# Patient Record
Sex: FEMALE | Race: BLACK OR AFRICAN AMERICAN | URBAN - METROPOLITAN AREA
[De-identification: names, ages, dates, MRNs, and addresses within clinical notes are randomized per-mention and may not be internally consistent; named-entity substitution may affect disease eponyms.]

---

## 2022-08-01 ENCOUNTER — HOSPITAL ENCOUNTER (OUTPATIENT)
Dept: NUTRITION | Age: 62
Discharge: HOME | End: 2022-08-01
Payer: COMMERCIAL

## 2022-08-01 PROCEDURE — 97802 MEDICAL NUTRITION INDIV IN: CPT

## 2022-08-03 NOTE — PROGRESS NOTES
[address] [place] [address]     Nutrition Assessment - Medical Nutrition Therapy   Outpatient Initial Evaluation         Patient Name: Aniya Strickland : 1960   Treatment Diagnosis: Obesity     Referral Source: Alwin Cogan, MD [facility]): 2022     Gender: female Age: 63 y.o. Ht: 60 in Wt: 279  lb  kg   BMI: 47 BMR   Male  BMR Female [id number]     Past Medical History:  Obesity       Pertinent Medications:   Amlodipine     Biochemical Data:   No results found for: HBA1C, [id number], [id number]  No results found for: NA, K, CL, CO2, AGAP, GLU, BUN, CREA, BUCR, GFRAA, GFRNA, CA, TBIL, TBILI, AP, TP, ALB, GLOB, AGRAT, ALT, AST  No results found for: CHOL, CHOLPOCT, CHOLX, CHLST, CHOLV, TOTCHOLEXT, HDL, HDLPOC, HDLEXT, HDLP, LDL, LDLCPOC, LDLCEXT, LDLC, DLDLP, VLDLC, VLDL, TGLX, TRIGL, TRIGLYCEXT, TRIGP, TGLPOCT, CHHD, CHHDX  No results found for: ALT, AST, GGT, GGTP, AP, APIT, APX, CBIL, TBIL, TBILI  No results found for: RONNY, CREAPOC, ACREA, CREA, REFC3, REFC4  No results found for: BUN, BUNPOC, IBUN, MBUNV, BUNV  No results found for: MCACR, MCA1, MCA2, MCA3, MCAU, MCAU2, MCALPOCT     Assessment:   Patient is a 63year old female  with a BMI of 54 who visits  for insight on how to lose weight. 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 Patient's activity consists of a sedentary lifestyle. Patient's sleeping habits consists of some need of improvement. Food & Nutrition: Patient eats 3 meals a day and snacks. Breakfast may consist of scrambled eggs, piece of bread, peanut butter and jellly. A lunch choice may be fruit choices, cheese and crackers, pb&j. Dinner choices consist of a large portion of fried chicken or fish a vegetable and a starch. Snacks may be fruit, cheese and crackers.        Estimate Needs   Calories: 140  Protein: 60 Carbs: <125 Fat: 60   Kcal/day  g/day g/day  g/day                            Nutrition Diagnosis Overweight/obesity related to excessive energy intake as evidenced by a BMI of 54. Undesirable food choices as evidenced by patients food recall; patient eats many higher carb snacks especially in the evening. Disordered eating pattern; patient eats a late breakfast and eats late at night. Nutrition Intervention &  Education: Encouraged pt to drink >64 ounces of only calorie free or very low calorie/carb beverages only. Educated pt on all carbohydrates found in foods and encouraged no more than 35-40 gm/meal and 15-20 gm/snack. Patient was educated on the importance of making lifestyle changes such as:  Eating off a smaller plate and drinking from smaller glasses. Increasing activity. Menu planning and tracking. Recommend My Fitness Pal for tracking. The importance of eating breakfast.  Appropriate snacks. Portion control. The importance of good sleeping habits. How to read a label. Patient was encouraged to do this before purchasing and consuming an item. Handouts Provided: [x]  Carbohydrates  [x]  Protein  [x]  Non-starchy Vegatbles  [x]  Food Label  [x]  Meal and Snack Ideas  [x]  Food Journals []  Diabetes  []  Cholesterol  []  Sodium  [x]  Gen Nutr Guidelines  []  SBGM Guidelines  []  Others:   Information Reviewed with: Patient    Readiness to Change Stage:   []  Pre-contemplative    []  Contemplative  []  Preparation               [x]  Action                  []  Maintenance   Potential Barriers to Learning: []  Decline in memory    []  Language barrier   []  Other:  []  Emotional                  []  Limited mobility  []  Lack of motivation     [] Vision, hearing or cognitive impairment   Expected Compliance: Good.      Nutritional Goal - To promote lifestyle changes to result in:    [x]  Weight loss  []  Improved diabetic control  []  Decreased cholesterol levels  []  Decreased blood pressure  []  Weight maintenance [] Preventing any interactions associated with food allergies  []  Adequate weight gain toward goal weight  []  Other:        Patient Goals:    Patient to improve her sleeping habits. Patient will begin to read labels. Patient will begin to monitor carbohydrate intake and combine with vegetables or a protein. Dietitian Signature:  Stas Leal RD Date: 08/01/2022   Follow-up: Scheduled  09/12/2022  4:30 pm

## 2022-09-12 ENCOUNTER — HOSPITAL ENCOUNTER (OUTPATIENT)
Dept: NUTRITION | Age: 62
Discharge: HOME | End: 2022-09-12
Payer: COMMERCIAL

## 2022-09-12 PROCEDURE — 97803 MED NUTRITION INDIV SUBSEQ: CPT

## 2022-09-14 NOTE — PROGRESS NOTES
NUTRITION - FOLLOW-UP TREATMENT NOTE  Patient Name: Hugh Notice         Date: 2022  : 1960    YES/NO Patient  Verified  Diagnosis: Obesity      Total Treatment Time (min):   30     SUBJECTIVE/ASSESSMENT:  Patient is doing well. She had a rough months due to several anniversaries related to her   who she misses. She still managed to lose 2#. Changes in medication or medical history? Any new allergies, surgeries or procedures? YES/NO    If yes, update Summary List   None reported         Current Wt: 277# Previous Wt: 279# Wt Change: 2#     Achievement of Goals:  Patient has lost 2#     Patient Education:  [x]  Review current plan with patient   []  Other:    Handouts/  Information Provided: []  Carbohydrates  []  Protein  []  Fiber  []  Serving Sizes  []  Fluids  []  General guidelines []  Diabetes  []  Cholesterol  []  Sodium  []  SBGM  []  Food Journals  []  Others:      New Patient Goals: Go for walk in AM for practice for when patient gets her dog. Increase vegetables.      PLAN    [x]  Continue on current plan []  Follow-up PRN   []  Discharge due to :    [x]  Next appt: 10/17/2022  at 4:30 pm     Dietitian: Wendi Mullins RD    Date: 2022

## 2022-10-17 ENCOUNTER — HOSPITAL ENCOUNTER (OUTPATIENT)
Dept: NUTRITION | Age: 62
End: 2022-10-17